# Patient Record
Sex: MALE | Race: WHITE | NOT HISPANIC OR LATINO | Employment: OTHER | URBAN - METROPOLITAN AREA
[De-identification: names, ages, dates, MRNs, and addresses within clinical notes are randomized per-mention and may not be internally consistent; named-entity substitution may affect disease eponyms.]

---

## 2023-03-23 LAB
AMPHETAMINE (PRESENCE) IN URINE BY SCREEN METHOD: NORMAL
BARBITURATES PRESENCE IN URINE BY SCREEN METHOD: NORMAL
BENZODIAZEPINE (PRESENCE) IN URINE BY SCREEN METHOD: NORMAL
CANNABINOIDS IN URINE BY SCREEN METHOD: NORMAL
COCAINE (PRESENCE) IN URINE BY SCREEN METHOD: NORMAL
DRUG SCREEN COMMENT URINE: NORMAL
FENTANYL URINE: NORMAL
METHADONE (PRESENCE) IN URINE BY SCREEN METHOD: NORMAL
OPIATES (PRESENCE) IN URINE BY SCREEN METHOD: NORMAL
OXYCODONE (PRESENCE) IN URINE BY SCREEN METHOD: NORMAL
PHENCYCLIDINE (PRESENCE) IN URINE BY SCREEN METHOD: NORMAL

## 2023-10-13 ENCOUNTER — TELEPHONE (OUTPATIENT)
Dept: NEUROLOGY | Facility: CLINIC | Age: 43
End: 2023-10-13
Payer: COMMERCIAL

## 2023-10-13 DIAGNOSIS — F90.2 ATTENTION DEFICIT HYPERACTIVITY DISORDER (ADHD), COMBINED TYPE: Primary | ICD-10-CM

## 2023-10-13 RX ORDER — DEXTROAMPHETAMINE SACCHARATE, AMPHETAMINE ASPARTATE, DEXTROAMPHETAMINE SULFATE AND AMPHETAMINE SULFATE 7.5; 7.5; 7.5; 7.5 MG/1; MG/1; MG/1; MG/1
1 TABLET ORAL 2 TIMES DAILY
COMMUNITY
End: 2023-10-13 | Stop reason: SDUPTHER

## 2023-10-13 RX ORDER — DEXTROAMPHETAMINE SACCHARATE, AMPHETAMINE ASPARTATE, DEXTROAMPHETAMINE SULFATE AND AMPHETAMINE SULFATE 7.5; 7.5; 7.5; 7.5 MG/1; MG/1; MG/1; MG/1
1 TABLET ORAL 2 TIMES DAILY
Qty: 60 TABLET | Refills: 0 | Status: SHIPPED | OUTPATIENT
Start: 2023-10-13 | End: 2023-11-10 | Stop reason: SDUPTHER

## 2023-10-25 PROBLEM — M54.16 LUMBAR RADICULOPATHY, CHRONIC: Status: ACTIVE | Noted: 2023-10-25

## 2023-10-25 PROBLEM — M25.551 RIGHT HIP PAIN: Status: ACTIVE | Noted: 2023-10-25

## 2023-10-25 PROBLEM — S73.192A ACETABULAR LABRUM TEAR, LEFT, INITIAL ENCOUNTER: Status: ACTIVE | Noted: 2023-10-25

## 2023-10-25 PROBLEM — R51.9 HEADACHE: Status: ACTIVE | Noted: 2023-10-25

## 2023-10-25 PROBLEM — K43.2 INCISIONAL HERNIA: Status: ACTIVE | Noted: 2023-10-25

## 2023-10-25 PROBLEM — R21 RASH: Status: ACTIVE | Noted: 2023-10-25

## 2023-10-25 PROBLEM — M25.851 FEMOROACETABULAR IMPINGEMENT OF RIGHT HIP: Status: ACTIVE | Noted: 2023-10-25

## 2023-10-25 PROBLEM — M16.12 OSTEOARTHRITIS OF LEFT HIP: Status: ACTIVE | Noted: 2023-10-25

## 2023-10-25 PROBLEM — B19.20 HEPATITIS C: Status: ACTIVE | Noted: 2023-10-25

## 2023-10-25 PROBLEM — G35 MULTIPLE SCLEROSIS (MULTI): Status: ACTIVE | Noted: 2023-10-25

## 2023-10-25 PROBLEM — F90.9 ADHD (ATTENTION DEFICIT HYPERACTIVITY DISORDER): Status: ACTIVE | Noted: 2023-10-25

## 2023-10-25 RX ORDER — NALOXONE HYDROCHLORIDE 4 MG/.1ML
4 SPRAY NASAL
COMMUNITY
Start: 2023-05-26 | End: 2023-10-26 | Stop reason: ALTCHOICE

## 2023-10-25 RX ORDER — CARVEDILOL 6.25 MG/1
6.25 TABLET ORAL 2 TIMES DAILY
COMMUNITY
Start: 2023-06-16 | End: 2023-10-26 | Stop reason: ALTCHOICE

## 2023-10-25 RX ORDER — QUETIAPINE FUMARATE 300 MG/1
300 TABLET, FILM COATED ORAL NIGHTLY
COMMUNITY
Start: 2023-08-29 | End: 2023-10-26 | Stop reason: ALTCHOICE

## 2023-10-25 RX ORDER — QUETIAPINE 400 MG/1
400 TABLET, FILM COATED, EXTENDED RELEASE ORAL NIGHTLY
COMMUNITY
Start: 2023-10-06

## 2023-10-25 RX ORDER — QUETIAPINE FUMARATE 100 MG/1
100 TABLET, FILM COATED ORAL NIGHTLY
COMMUNITY
Start: 2023-07-29 | End: 2023-10-26 | Stop reason: ALTCHOICE

## 2023-10-25 RX ORDER — BUPRENORPHINE HYDROCHLORIDE AND NALOXONE HYDROCHLORIDE DIHYDRATE 8; 2 MG/1; MG/1
1 TABLET SUBLINGUAL 2 TIMES DAILY
COMMUNITY
Start: 2023-10-07

## 2023-10-25 RX ORDER — NAPROXEN 500 MG/1
500 TABLET ORAL 2 TIMES DAILY PRN
COMMUNITY
Start: 2019-10-15 | End: 2023-10-26 | Stop reason: ALTCHOICE

## 2023-10-25 RX ORDER — GABAPENTIN 800 MG/1
800 TABLET ORAL 3 TIMES DAILY
COMMUNITY
End: 2024-03-11 | Stop reason: SDUPTHER

## 2023-10-25 RX ORDER — BUPRENORPHINE HYDROCHLORIDE AND NALOXONE HYDROCHLORIDE DIHYDRATE 2; .5 MG/1; MG/1
1 TABLET SUBLINGUAL 2 TIMES DAILY
COMMUNITY
Start: 2022-12-13 | End: 2023-10-26 | Stop reason: ALTCHOICE

## 2023-10-25 RX ORDER — NAPROXEN 250 MG/1
250 TABLET ORAL 2 TIMES DAILY PRN
COMMUNITY
End: 2023-10-26 | Stop reason: ALTCHOICE

## 2023-10-25 RX ORDER — DICLOFENAC SODIUM 10 MG/G
2 GEL TOPICAL 4 TIMES DAILY PRN
COMMUNITY
Start: 2023-06-15 | End: 2023-10-26 | Stop reason: ALTCHOICE

## 2023-10-25 RX ORDER — TAMSULOSIN HYDROCHLORIDE 0.4 MG/1
0.4 CAPSULE ORAL DAILY
COMMUNITY
Start: 2023-08-30

## 2023-10-25 RX ORDER — LURASIDONE HYDROCHLORIDE 40 MG/1
40 TABLET, FILM COATED ORAL
COMMUNITY
Start: 2023-10-06

## 2023-10-25 RX ORDER — HYDROXYZINE HYDROCHLORIDE 25 MG/1
TABLET, FILM COATED ORAL
COMMUNITY
Start: 2022-12-22

## 2023-10-25 RX ORDER — DIPHENHYDRAMINE HCL 25 MG
CAPSULE ORAL
COMMUNITY
Start: 2023-03-13

## 2023-10-25 RX ORDER — QUETIAPINE FUMARATE 200 MG/1
200 TABLET, FILM COATED ORAL NIGHTLY
COMMUNITY
Start: 2023-01-18 | End: 2023-10-26 | Stop reason: ALTCHOICE

## 2023-10-26 ENCOUNTER — APPOINTMENT (OUTPATIENT)
Dept: NEUROLOGY | Facility: CLINIC | Age: 43
End: 2023-10-26
Payer: COMMERCIAL

## 2023-10-26 ENCOUNTER — TELEMEDICINE (OUTPATIENT)
Dept: NEUROLOGY | Facility: CLINIC | Age: 43
End: 2023-10-26
Payer: COMMERCIAL

## 2023-10-26 DIAGNOSIS — F90.2 ATTENTION DEFICIT HYPERACTIVITY DISORDER (ADHD), COMBINED TYPE: ICD-10-CM

## 2023-10-26 DIAGNOSIS — G35 MULTIPLE SCLEROSIS (MULTI): Primary | ICD-10-CM

## 2023-10-26 PROCEDURE — 99213 OFFICE O/P EST LOW 20 MIN: CPT | Performed by: PSYCHIATRY & NEUROLOGY
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
## 2023-11-01 ENCOUNTER — PHARMACY VISIT (OUTPATIENT)
Dept: PHARMACY | Facility: CLINIC | Age: 43
End: 2023-11-01
Payer: COMMERCIAL

## 2023-11-01 ENCOUNTER — SPECIALTY PHARMACY (OUTPATIENT)
Dept: PHARMACY | Facility: CLINIC | Age: 43
End: 2023-11-01

## 2023-11-01 PROCEDURE — RXMED WILLOW AMBULATORY MEDICATION CHARGE

## 2023-11-10 DIAGNOSIS — F90.2 ATTENTION DEFICIT HYPERACTIVITY DISORDER (ADHD), COMBINED TYPE: ICD-10-CM

## 2023-11-13 RX ORDER — DEXTROAMPHETAMINE SACCHARATE, AMPHETAMINE ASPARTATE, DEXTROAMPHETAMINE SULFATE AND AMPHETAMINE SULFATE 7.5; 7.5; 7.5; 7.5 MG/1; MG/1; MG/1; MG/1
1 TABLET ORAL 2 TIMES DAILY
Qty: 60 TABLET | Refills: 0 | Status: SHIPPED | OUTPATIENT
Start: 2023-11-13 | End: 2023-12-11 | Stop reason: SDUPTHER

## 2023-11-30 ENCOUNTER — SPECIALTY PHARMACY (OUTPATIENT)
Dept: PHARMACY | Facility: CLINIC | Age: 43
End: 2023-11-30

## 2023-12-06 ENCOUNTER — SPECIALTY PHARMACY (OUTPATIENT)
Dept: PHARMACY | Facility: CLINIC | Age: 43
End: 2023-12-06

## 2023-12-06 PROCEDURE — RXMED WILLOW AMBULATORY MEDICATION CHARGE

## 2023-12-07 ENCOUNTER — PHARMACY VISIT (OUTPATIENT)
Dept: PHARMACY | Facility: CLINIC | Age: 43
End: 2023-12-07
Payer: COMMERCIAL

## 2023-12-11 DIAGNOSIS — F90.2 ATTENTION DEFICIT HYPERACTIVITY DISORDER (ADHD), COMBINED TYPE: ICD-10-CM

## 2023-12-11 RX ORDER — DEXTROAMPHETAMINE SACCHARATE, AMPHETAMINE ASPARTATE, DEXTROAMPHETAMINE SULFATE AND AMPHETAMINE SULFATE 7.5; 7.5; 7.5; 7.5 MG/1; MG/1; MG/1; MG/1
1 TABLET ORAL 2 TIMES DAILY
Qty: 60 TABLET | Refills: 0 | Status: SHIPPED | OUTPATIENT
Start: 2023-12-11 | End: 2023-12-13 | Stop reason: SDUPTHER

## 2023-12-13 DIAGNOSIS — F90.2 ATTENTION DEFICIT HYPERACTIVITY DISORDER (ADHD), COMBINED TYPE: ICD-10-CM

## 2023-12-13 RX ORDER — DEXTROAMPHETAMINE SACCHARATE, AMPHETAMINE ASPARTATE, DEXTROAMPHETAMINE SULFATE AND AMPHETAMINE SULFATE 7.5; 7.5; 7.5; 7.5 MG/1; MG/1; MG/1; MG/1
1 TABLET ORAL 2 TIMES DAILY
Qty: 60 TABLET | Refills: 0 | Status: SHIPPED
Start: 2023-12-13 | End: 2023-12-14 | Stop reason: WASHOUT

## 2023-12-14 DIAGNOSIS — F90.2 ATTENTION DEFICIT HYPERACTIVITY DISORDER (ADHD), COMBINED TYPE: Primary | ICD-10-CM

## 2023-12-14 RX ORDER — DEXTROAMPHETAMINE SACCHARATE, AMPHETAMINE ASPARTATE, DEXTROAMPHETAMINE SULFATE AND AMPHETAMINE SULFATE 5; 5; 5; 5 MG/1; MG/1; MG/1; MG/1
TABLET ORAL
Qty: 90 TABLET | Refills: 0 | Status: SHIPPED | OUTPATIENT
Start: 2023-12-14 | End: 2024-01-09 | Stop reason: SDUPTHER

## 2024-01-04 ENCOUNTER — PHARMACY VISIT (OUTPATIENT)
Dept: PHARMACY | Facility: CLINIC | Age: 44
End: 2024-01-04
Payer: COMMERCIAL

## 2024-01-04 ENCOUNTER — SPECIALTY PHARMACY (OUTPATIENT)
Dept: PHARMACY | Facility: CLINIC | Age: 44
End: 2024-01-04

## 2024-01-04 PROCEDURE — RXMED WILLOW AMBULATORY MEDICATION CHARGE

## 2024-01-09 DIAGNOSIS — F90.2 ATTENTION DEFICIT HYPERACTIVITY DISORDER (ADHD), COMBINED TYPE: ICD-10-CM

## 2024-01-10 RX ORDER — DEXTROAMPHETAMINE SACCHARATE, AMPHETAMINE ASPARTATE, DEXTROAMPHETAMINE SULFATE AND AMPHETAMINE SULFATE 5; 5; 5; 5 MG/1; MG/1; MG/1; MG/1
TABLET ORAL
Qty: 90 TABLET | Refills: 0 | Status: SHIPPED
Start: 2024-01-10 | End: 2024-02-05 | Stop reason: SDUPTHER

## 2024-02-01 ENCOUNTER — SPECIALTY PHARMACY (OUTPATIENT)
Dept: PHARMACY | Facility: CLINIC | Age: 44
End: 2024-02-01

## 2024-02-01 PROCEDURE — RXMED WILLOW AMBULATORY MEDICATION CHARGE

## 2024-02-02 ENCOUNTER — PHARMACY VISIT (OUTPATIENT)
Dept: PHARMACY | Facility: CLINIC | Age: 44
End: 2024-02-02
Payer: COMMERCIAL

## 2024-02-05 DIAGNOSIS — F90.2 ATTENTION DEFICIT HYPERACTIVITY DISORDER (ADHD), COMBINED TYPE: ICD-10-CM

## 2024-02-07 RX ORDER — DEXTROAMPHETAMINE SACCHARATE, AMPHETAMINE ASPARTATE, DEXTROAMPHETAMINE SULFATE AND AMPHETAMINE SULFATE 5; 5; 5; 5 MG/1; MG/1; MG/1; MG/1
TABLET ORAL
Qty: 90 TABLET | Refills: 0 | Status: SHIPPED | OUTPATIENT
Start: 2024-02-07 | End: 2024-03-07 | Stop reason: SDUPTHER

## 2024-02-29 ENCOUNTER — TELEMEDICINE (OUTPATIENT)
Dept: NEUROLOGY | Facility: CLINIC | Age: 44
End: 2024-02-29
Payer: COMMERCIAL

## 2024-02-29 DIAGNOSIS — G35 MULTIPLE SCLEROSIS (MULTI): Primary | ICD-10-CM

## 2024-02-29 DIAGNOSIS — F90.2 ATTENTION DEFICIT HYPERACTIVITY DISORDER (ADHD), COMBINED TYPE: ICD-10-CM

## 2024-02-29 PROCEDURE — 99213 OFFICE O/P EST LOW 20 MIN: CPT | Performed by: PSYCHIATRY & NEUROLOGY

## 2024-02-29 RX ORDER — MULTIVITAMIN
1 TABLET ORAL
COMMUNITY
Start: 2013-05-24

## 2024-02-29 RX ORDER — QUETIAPINE FUMARATE 200 MG/1
TABLET, FILM COATED ORAL
COMMUNITY
Start: 2024-02-16

## 2024-02-29 RX ORDER — PREDNISONE 10 MG/1
TABLET ORAL
Qty: 15 TABLET | Refills: 0 | Status: SHIPPED | OUTPATIENT
Start: 2024-02-29

## 2024-02-29 NOTE — PROGRESS NOTES
NEUROLOGY OUTPATIENT FOLLOW-UP NOTE    Assessment/Plan   Diagnoses and all orders for this visit:  Multiple sclerosis (CMS/HCC)  -     predniSONE (Deltasone) 10 mg tablet; 5 by mouth daily x 1 dy, 4 daily x 1 dy, 3 daily x 1 dy, 2 daily x 1 dy, 1 daily x 1 dy  Attention deficit hyperactivity disorder (ADHD), combined type      IMPRESSION:  MS.  New sensory symptoms could be MS or lumbar radiculopathy.  Stable ADHD    PLAN:  Prednisone taper over five days from 50 mg.  If not helpful, I will have to consider a three-day IV Solumedrol regimen.  Continue amphetamine-dextroamphetamine.  Follow-up 3-4 months, and he will do a physical visit at that time.      Alec Gunn Jr., M.D., [facility]   ----------    Virtual or Telephone Consent    A telephone visit (audio only) between the patient (at the originating site) and the provider (at the distant site) was utilized to provide this telehealth service.   Verbal consent was requested and obtained from Alec Conner on this date, 02/29/24 for a telehealth visit.   Multiple attempts at a video visit failed    I have personally reviewed the OARRS report for Alec Conner   I have considered the risks of abuse, dependence, addiction and diversion.   Last urine drug screening date ordered:  3/20/2023  Results of last screen:  Results as expected.  Controlled Substance Agreement:   I have printed this form and reviewed each line item with the patient and the patient has verbalized understanding.   Date of the last Controlled Substance Agreement:  3/20/2023     Medical marijuana registration number:  [id number], via [employer]; used for arthritis pain and cramping.    Subjective     Alec Conner is a 43 y.o. year old male here for follow-up.    Two weeks of tingling of the feet  Waking up with severe cramping in both legs.  Hands go numb as well    Adderall still helpful for ADHD        Past Medical History:   Diagnosis Date    Personal history of other  diseases of the musculoskeletal system and connective tissue     History of backache     Past Surgical History:   Procedure Laterality Date    CHOLECYSTECTOMY  02/26/2015    Cholecystectomy     Social History     Tobacco Use    Smoking status: Every Day     Types: Cigarettes    Smokeless tobacco: Never   Substance Use Topics    Alcohol use: Not Currently     Comment: Sober since 2/2023     family history includes Cancer in his father, mother, paternal grandfather, and paternal grandmother; Hypertension in his maternal grandfather, maternal grandmother, mother, paternal grandfather, and paternal grandmother; Stroke in his mother; cardiac disorder in his maternal grandfather, maternal grandmother, mother, paternal grandfather, and paternal grandmother.    Current Outpatient Medications:     multivitamin tablet, Take 1 tablet by mouth once daily., Disp: , Rfl:     QUEtiapine (SEROquel) 200 mg tablet, , Disp: , Rfl:     amphetamine-dextroamphetamine (Adderall) 20 mg tablet, 2 in morning, 1 at noon, Disp: 90 tablet, Rfl: 0    buprenorphine-naloxone (Suboxone) 8-2 mg SL tablet, Place 1 tablet under the tongue 2 times a day., Disp: , Rfl:     dimethyl fumarate (Tecfidera) 120 mg capsule,delayed release(DR/EC) capsule, TAKE ONE (1) CAPSULE BY MOUTH 2 TIMES DAILY., Disp: 180 capsule, Rfl: 3    gabapentin (Neurontin) 800 mg tablet, Take 1 tablet (800 mg) by mouth 3 times a day., Disp: , Rfl:     hydrOXYzine HCL (Atarax) 25 mg tablet, Take by mouth., Disp: , Rfl:     lurasidone (Latuda) 40 mg tablet, Take 1 tablet (40 mg) by mouth once daily in the evening. Take with meals., Disp: , Rfl:     nicotine polacrilex (Nicorette) 4 mg gum, Chew., Disp: , Rfl:     QUEtiapine XR (SEROquel XR) 400 mg 24 hr tablet, Take 1 tablet (400 mg) by mouth once daily at bedtime., Disp: , Rfl:     tamsulosin (Flomax) 0.4 mg 24 hr capsule, Take 1 capsule (0.4 mg) by mouth once daily., Disp: , Rfl:   Allergies   Allergen Reactions    Penicillins  Hives       Objective     There were no vitals taken for this visit.    CONSTITUTIONAL:  No acute distress    MENTAL STATUS:  Awake, alert, fully oriented to self, place, and time, with present short-term memory, good awareness of recent events, normal attention span, concentration, and fund of knowledge.    SPEECH AND LANGUAGE:  Can name and repeat, follows all commands, has no dysarthria    Remainder of exam deferred because of telephone visit    Alec Gunn Jr., M.D., FAAN

## 2024-03-01 ENCOUNTER — SPECIALTY PHARMACY (OUTPATIENT)
Dept: NEUROLOGY | Facility: HOSPITAL | Age: 44
End: 2024-03-01
Payer: COMMERCIAL

## 2024-03-06 ENCOUNTER — SPECIALTY PHARMACY (OUTPATIENT)
Dept: PHARMACY | Facility: CLINIC | Age: 44
End: 2024-03-06

## 2024-03-06 PROCEDURE — RXMED WILLOW AMBULATORY MEDICATION CHARGE

## 2024-03-07 ENCOUNTER — PHARMACY VISIT (OUTPATIENT)
Dept: PHARMACY | Facility: CLINIC | Age: 44
End: 2024-03-07
Payer: COMMERCIAL

## 2024-03-07 DIAGNOSIS — F90.2 ATTENTION DEFICIT HYPERACTIVITY DISORDER (ADHD), COMBINED TYPE: ICD-10-CM

## 2024-03-07 RX ORDER — DEXTROAMPHETAMINE SACCHARATE, AMPHETAMINE ASPARTATE, DEXTROAMPHETAMINE SULFATE AND AMPHETAMINE SULFATE 5; 5; 5; 5 MG/1; MG/1; MG/1; MG/1
TABLET ORAL
Qty: 90 TABLET | Refills: 0 | Status: SHIPPED | OUTPATIENT
Start: 2024-03-07 | End: 2024-04-09 | Stop reason: SDUPTHER

## 2024-03-11 DIAGNOSIS — G35 MULTIPLE SCLEROSIS (MULTI): Primary | ICD-10-CM

## 2024-03-13 RX ORDER — GABAPENTIN 800 MG/1
800 TABLET ORAL 3 TIMES DAILY
Qty: 270 TABLET | Refills: 3 | Status: SHIPPED | OUTPATIENT
Start: 2024-03-13 | End: 2025-03-13

## 2024-03-28 ENCOUNTER — SPECIALTY PHARMACY (OUTPATIENT)
Dept: PHARMACY | Facility: CLINIC | Age: 44
End: 2024-03-28

## 2024-03-28 RX ORDER — DIMETHYL FUMARATE 120 MG/1
CAPSULE ORAL 2 TIMES DAILY
Qty: 180 CAPSULE | Refills: 3 | Status: CANCELLED | OUTPATIENT
Start: 2024-03-28 | End: 2025-03-28

## 2024-03-29 DIAGNOSIS — G35 MULTIPLE SCLEROSIS (MULTI): Primary | ICD-10-CM

## 2024-04-01 PROCEDURE — RXMED WILLOW AMBULATORY MEDICATION CHARGE

## 2024-04-01 RX ORDER — DIMETHYL FUMARATE 120 MG/1
CAPSULE ORAL 2 TIMES DAILY
Qty: 180 CAPSULE | Refills: 3 | Status: SHIPPED | OUTPATIENT
Start: 2024-04-01 | End: 2025-04-01

## 2024-04-03 ENCOUNTER — SPECIALTY PHARMACY (OUTPATIENT)
Dept: PHARMACY | Facility: CLINIC | Age: 44
End: 2024-04-03

## 2024-04-04 ENCOUNTER — PHARMACY VISIT (OUTPATIENT)
Dept: PHARMACY | Facility: CLINIC | Age: 44
End: 2024-04-04
Payer: COMMERCIAL

## 2024-04-09 DIAGNOSIS — F90.2 ATTENTION DEFICIT HYPERACTIVITY DISORDER (ADHD), COMBINED TYPE: ICD-10-CM

## 2024-04-10 RX ORDER — DEXTROAMPHETAMINE SACCHARATE, AMPHETAMINE ASPARTATE, DEXTROAMPHETAMINE SULFATE AND AMPHETAMINE SULFATE 5; 5; 5; 5 MG/1; MG/1; MG/1; MG/1
TABLET ORAL
Qty: 90 TABLET | Refills: 0 | Status: SHIPPED | OUTPATIENT
Start: 2024-04-10 | End: 2024-05-08 | Stop reason: SDUPTHER

## 2024-04-30 ENCOUNTER — SPECIALTY PHARMACY (OUTPATIENT)
Dept: PHARMACY | Facility: CLINIC | Age: 44
End: 2024-04-30

## 2024-04-30 PROCEDURE — RXMED WILLOW AMBULATORY MEDICATION CHARGE

## 2024-05-02 ENCOUNTER — PHARMACY VISIT (OUTPATIENT)
Dept: PHARMACY | Facility: CLINIC | Age: 44
End: 2024-05-02
Payer: COMMERCIAL

## 2024-05-08 DIAGNOSIS — F90.2 ATTENTION DEFICIT HYPERACTIVITY DISORDER (ADHD), COMBINED TYPE: ICD-10-CM

## 2024-05-14 RX ORDER — DEXTROAMPHETAMINE SACCHARATE, AMPHETAMINE ASPARTATE, DEXTROAMPHETAMINE SULFATE AND AMPHETAMINE SULFATE 5; 5; 5; 5 MG/1; MG/1; MG/1; MG/1
TABLET ORAL
Qty: 90 TABLET | Refills: 0 | Status: SHIPPED | OUTPATIENT
Start: 2024-05-14 | End: 2024-06-05 | Stop reason: SDUPTHER

## 2024-05-23 ENCOUNTER — SPECIALTY PHARMACY (OUTPATIENT)
Dept: PHARMACY | Facility: CLINIC | Age: 44
End: 2024-05-23

## 2024-05-23 PROCEDURE — RXMED WILLOW AMBULATORY MEDICATION CHARGE

## 2024-05-24 ENCOUNTER — APPOINTMENT (OUTPATIENT)
Dept: NEUROLOGY | Facility: CLINIC | Age: 44
End: 2024-05-24
Payer: COMMERCIAL

## 2024-05-30 ENCOUNTER — SPECIALTY PHARMACY (OUTPATIENT)
Dept: PHARMACY | Facility: CLINIC | Age: 44
End: 2024-05-30

## 2024-06-04 ENCOUNTER — SPECIALTY PHARMACY (OUTPATIENT)
Dept: NEUROLOGY | Facility: HOSPITAL | Age: 44
End: 2024-06-04
Payer: COMMERCIAL

## 2024-06-04 NOTE — PROGRESS NOTES
[facility] Clinical Note    Alec Conner is a 44 y.o. male, who is on the specialty pharmacy service of: Multiple Sclerosis Core.  Alec Conner is taking: dimethyl fumarate 120 mg BID.     Alec was contacted on 6/4/2024.    Refer to the encounter summary report for documentation details about patient counseling and education.      Impression/Plan  Is patient high risk? (potential patients:  pregnancy, geriatric, pediatric)   no  Is laboratory follow-up needed? yes  Is a clinical intervention needed?  no  Next assessment date?  9/2/2024  Additional comments: n/a    Medication Adherence  The importance of adherence was discussed with the patient and they were advised to take the medication as prescribed by their provider. Alec was encouraged to call his physician's office if they have a question regarding a missed dose.     QOL/Patient Satisfaction  Rate your quality of life on scale of 1-10: 10 - Completely satisfied  Rate your satisfaction with  Specialty Pharmacy on scale of 1-10: 10 - Completely satisfied      Patient advised to contact the pharmacy if there are any changes to her medication list, including prescriptions, OTC medications, herbal products, or supplements. Patient was advised of [facility]’s dispensing process, refill timeline, contact information ([phone]), and patient management follow up. Patient confirmed understanding of education conducted during assessment. All patient questions and concerns were addressed to the best of my ability. Patient was encouraged to contact the specialty pharmacy with any questions or concerns.    Confirmed follow-up outreaches are properly scheduled. Reviewed goals of therapy in the program targets.    Elfego Conte, PharmD

## 2024-06-05 ENCOUNTER — SPECIALTY PHARMACY (OUTPATIENT)
Dept: PHARMACY | Facility: CLINIC | Age: 44
End: 2024-06-05

## 2024-06-05 DIAGNOSIS — F90.2 ATTENTION DEFICIT HYPERACTIVITY DISORDER (ADHD), COMBINED TYPE: ICD-10-CM

## 2024-06-06 RX ORDER — DEXTROAMPHETAMINE SACCHARATE, AMPHETAMINE ASPARTATE, DEXTROAMPHETAMINE SULFATE AND AMPHETAMINE SULFATE 5; 5; 5; 5 MG/1; MG/1; MG/1; MG/1
TABLET ORAL
Qty: 90 TABLET | Refills: 0 | Status: SHIPPED | OUTPATIENT
Start: 2024-06-06 | End: 2024-06-10 | Stop reason: SDUPTHER

## 2024-06-07 ENCOUNTER — TELEPHONE (OUTPATIENT)
Dept: NEUROLOGY | Facility: CLINIC | Age: 44
End: 2024-06-07
Payer: COMMERCIAL

## 2024-06-07 ENCOUNTER — PHARMACY VISIT (OUTPATIENT)
Dept: PHARMACY | Facility: CLINIC | Age: 44
End: 2024-06-07
Payer: COMMERCIAL

## 2024-06-07 DIAGNOSIS — F90.2 ATTENTION DEFICIT HYPERACTIVITY DISORDER (ADHD), COMBINED TYPE: ICD-10-CM

## 2024-06-10 RX ORDER — DEXTROAMPHETAMINE SACCHARATE, AMPHETAMINE ASPARTATE, DEXTROAMPHETAMINE SULFATE AND AMPHETAMINE SULFATE 5; 5; 5; 5 MG/1; MG/1; MG/1; MG/1
TABLET ORAL
Qty: 90 TABLET | Refills: 0 | Status: SHIPPED | OUTPATIENT
Start: 2024-06-10

## 2024-06-26 ENCOUNTER — APPOINTMENT (OUTPATIENT)
Dept: NEUROLOGY | Facility: CLINIC | Age: 44
End: 2024-06-26
Payer: COMMERCIAL

## 2024-06-26 DIAGNOSIS — G35 MULTIPLE SCLEROSIS (MULTI): Primary | ICD-10-CM

## 2024-06-26 DIAGNOSIS — F90.2 ATTENTION DEFICIT HYPERACTIVITY DISORDER (ADHD), COMBINED TYPE: ICD-10-CM

## 2024-06-26 PROCEDURE — 99213 OFFICE O/P EST LOW 20 MIN: CPT | Performed by: PSYCHIATRY & NEUROLOGY

## 2024-06-26 NOTE — PROGRESS NOTES
NEUROLOGY OUTPATIENT FOLLOW-UP NOTE    Assessment/Plan   Diagnoses and all orders for this visit:  Multiple sclerosis (Multi)  Attention deficit hyperactivity disorder (ADHD), combined type      IMPRESSION:  Stable MS.  Transient left weakness could be microexacerbation or pseudoexacerbation of MS in the context of use of marijuana.  Stable ADHD.    PLAN:  If the weakness returns and persists, I advised ED visit or to call me.  Continue dimethyl fumarate for MS prophylaxis.  Continue amphetamine-dextroamphetamine for ADHD.  I will see him in person in three months or prn.      Alec Gunn Jr., M.D., FAAN   ----------    Virtual or Telephone Consent    An interactive audio and video telecommunication system which permits real time communications between the patient (at the originating site) and provider (at the distant site) was utilized to provide this telehealth service.   Verbal consent was requested and obtained from Alec Conner on this date, 06/26/24 for a telehealth visit.     I have personally reviewed the OARRS report for Alec Conner   I have considered the risks of abuse, dependence, addiction and diversion.   Controlled Substance Agreement:   I have printed this form and reviewed each line item with the patient and the patient has verbalized understanding.   Date of the last Controlled Substance Agreement:  3/20/2023  Date of last urine tox screen:  2/16/2024 at [facility], positive for amphetamine, THC, and buprenorphine as expected for his known use of medical marijuana and opiate abuse treatment.      Subjective     Alec Conner is a 44 y.o. year old male here for follow-up.  History comes from the patient and from review of the available  and [facility] EMR.    He had transient weakness of the left side lasting a couple of days, about three weeks.  This was associated with marijuana use.  He presented to the [facility] ED, where basic labs reveals no acute pathology.  He says  he left the ED rather than remaining for further workup.  He currently has no focal neurological symptoms including dysarthria, dysphagia, diplopia, focal weakness, focal sensory change, ataxia, vertigo, or bowel/bladder incontinence, among others.      He continues to have relief of his ADHD symptoms with amphetamine-dextroamphetamine.    Past Medical History:   Diagnosis Date    Personal history of other diseases of the musculoskeletal system and connective tissue     History of backache     Past Surgical History:   Procedure Laterality Date    CHOLECYSTECTOMY  02/26/2015    Cholecystectomy     Social History     Tobacco Use    Smoking status: Every Day     Types: Cigarettes    Smokeless tobacco: Never   Substance Use Topics    Alcohol use: Not Currently     Comment: Sober since 2/2023     family history includes Cancer in his father, mother, paternal grandfather, and paternal grandmother; Hypertension in his maternal grandfather, maternal grandmother, mother, paternal grandfather, and paternal grandmother; Stroke in his mother; cardiac disorder in his maternal grandfather, maternal grandmother, mother, paternal grandfather, and paternal grandmother.    Current Outpatient Medications:     amphetamine-dextroamphetamine (Adderall) 20 mg tablet, 2 in morning, 1 at noon, Disp: 90 tablet, Rfl: 0    buprenorphine-naloxone (Suboxone) 8-2 mg SL tablet, Place 1 tablet under the tongue 2 times a day., Disp: , Rfl:     dimethyl fumarate (Tecfidera) 120 mg capsule,delayed release(DR/EC) capsule, TAKE ONE (1) CAPSULE BY MOUTH 2 TIMES DAILY., Disp: 180 capsule, Rfl: 3    gabapentin (Neurontin) 800 mg tablet, Take 1 tablet (800 mg) by mouth 3 times a day., Disp: 270 tablet, Rfl: 3    hydrOXYzine HCL (Atarax) 25 mg tablet, Take by mouth., Disp: , Rfl:     lurasidone (Latuda) 40 mg tablet, Take 1 tablet (40 mg) by mouth once daily in the evening. Take with meals., Disp: , Rfl:     multivitamin tablet, Take 1 tablet by mouth once  daily., Disp: , Rfl:     nicotine polacrilex (Nicorette) 4 mg gum, Chew., Disp: , Rfl:     predniSONE (Deltasone) 10 mg tablet, 5 by mouth daily x 1 dy, 4 daily x 1 dy, 3 daily x 1 dy, 2 daily x 1 dy, 1 daily x 1 dy, Disp: 15 tablet, Rfl: 0    QUEtiapine (SEROquel) 200 mg tablet, , Disp: , Rfl:     QUEtiapine XR (SEROquel XR) 400 mg 24 hr tablet, Take 1 tablet (400 mg) by mouth once daily at bedtime., Disp: , Rfl:     tamsulosin (Flomax) 0.4 mg 24 hr capsule, Take 1 capsule (0.4 mg) by mouth once daily., Disp: , Rfl:   Allergies   Allergen Reactions    Penicillins Hives       Objective     There were no vitals taken for this visit.    CONSTITUTIONAL:  No acute distress    MENTAL STATUS:  Awake, alert, fully oriented to self, place, and time, with present short-term memory, good awareness of recent events, normal attention span, concentration, and fund of knowledge.    SPEECH AND LANGUAGE:  Can name and repeat, follows all commands, has no dysarthria    FUNDOSCOPIC:  Deferred because of virtual visit    CRANIAL NERVES:  II-Vision grossly present, visual fields deferred because of virtual visit    III/IV/VI--EOMs are present in all directions.  No ptosis.  Pupillary response deferred because of virtual visit    V--Normal jaw movement.  Sensation deferred because of virtual visit    VII--No facial asymmetry.    VIII--Hearing grossly present given ability to hear me on the call.    IX/X--Symmetric soft palate rise.    XI--Antigravity, symmetrical trapezius power bilaterally.    XII--Tongue protrudes without deviation.    MOTOR:  Symmetric, antigravity power of both arms and both legs    SENSORY:  Deferred because of virtual visit    COORDINATION:  Normal finger-to-nose and heel-to-shin testing in both arms and both legs.    REFLEXES Deferred because of virtual visit    GAIT is normal, without spasticity, shuffling, ataxia, or steppage.      Alec Gunn Jr., M.D., FAAN

## 2024-07-05 ENCOUNTER — SPECIALTY PHARMACY (OUTPATIENT)
Dept: PHARMACY | Facility: CLINIC | Age: 44
End: 2024-07-05

## 2024-07-05 DIAGNOSIS — F90.2 ATTENTION DEFICIT HYPERACTIVITY DISORDER (ADHD), COMBINED TYPE: ICD-10-CM

## 2024-07-05 PROCEDURE — RXMED WILLOW AMBULATORY MEDICATION CHARGE

## 2024-07-05 RX ORDER — DEXTROAMPHETAMINE SACCHARATE, AMPHETAMINE ASPARTATE, DEXTROAMPHETAMINE SULFATE AND AMPHETAMINE SULFATE 5; 5; 5; 5 MG/1; MG/1; MG/1; MG/1
TABLET ORAL
Qty: 90 TABLET | Refills: 0 | Status: SHIPPED | OUTPATIENT
Start: 2024-07-05

## 2024-07-08 ENCOUNTER — PHARMACY VISIT (OUTPATIENT)
Dept: PHARMACY | Facility: CLINIC | Age: 44
End: 2024-07-08
Payer: COMMERCIAL

## 2024-07-30 ENCOUNTER — SPECIALTY PHARMACY (OUTPATIENT)
Dept: PHARMACY | Facility: CLINIC | Age: 44
End: 2024-07-30

## 2024-07-30 PROCEDURE — RXMED WILLOW AMBULATORY MEDICATION CHARGE

## 2024-08-01 DIAGNOSIS — F90.2 ATTENTION DEFICIT HYPERACTIVITY DISORDER (ADHD), COMBINED TYPE: ICD-10-CM

## 2024-08-02 RX ORDER — DEXTROAMPHETAMINE SACCHARATE, AMPHETAMINE ASPARTATE, DEXTROAMPHETAMINE SULFATE AND AMPHETAMINE SULFATE 5; 5; 5; 5 MG/1; MG/1; MG/1; MG/1
TABLET ORAL
Qty: 90 TABLET | Refills: 0 | Status: SHIPPED | OUTPATIENT
Start: 2024-08-02

## 2024-08-05 ENCOUNTER — PHARMACY VISIT (OUTPATIENT)
Dept: PHARMACY | Facility: CLINIC | Age: 44
End: 2024-08-05
Payer: COMMERCIAL

## 2024-08-19 DIAGNOSIS — F90.2 ATTENTION DEFICIT HYPERACTIVITY DISORDER (ADHD), COMBINED TYPE: ICD-10-CM

## 2024-08-19 RX ORDER — DEXTROAMPHETAMINE SACCHARATE, AMPHETAMINE ASPARTATE, DEXTROAMPHETAMINE SULFATE AND AMPHETAMINE SULFATE 5; 5; 5; 5 MG/1; MG/1; MG/1; MG/1
TABLET ORAL
Qty: 90 TABLET | Refills: 0 | OUTPATIENT
Start: 2024-08-19

## 2024-08-26 ENCOUNTER — SPECIALTY PHARMACY (OUTPATIENT)
Dept: PHARMACY | Facility: CLINIC | Age: 44
End: 2024-08-26

## 2024-08-26 PROCEDURE — RXMED WILLOW AMBULATORY MEDICATION CHARGE

## 2024-08-29 ENCOUNTER — SPECIALTY PHARMACY (OUTPATIENT)
Dept: PHARMACY | Facility: CLINIC | Age: 44
End: 2024-08-29

## 2024-08-30 ENCOUNTER — PHARMACY VISIT (OUTPATIENT)
Dept: PHARMACY | Facility: CLINIC | Age: 44
End: 2024-08-30
Payer: COMMERCIAL

## 2024-09-03 ENCOUNTER — TELEPHONE (OUTPATIENT)
Dept: NEUROLOGY | Facility: CLINIC | Age: 44
End: 2024-09-03
Payer: COMMERCIAL

## 2024-09-03 ENCOUNTER — SPECIALTY PHARMACY (OUTPATIENT)
Dept: NEUROLOGY | Facility: HOSPITAL | Age: 44
End: 2024-09-03
Payer: COMMERCIAL

## 2024-09-03 DIAGNOSIS — F90.2 ATTENTION DEFICIT HYPERACTIVITY DISORDER (ADHD), COMBINED TYPE: ICD-10-CM

## 2024-09-03 RX ORDER — DEXTROAMPHETAMINE SACCHARATE, AMPHETAMINE ASPARTATE, DEXTROAMPHETAMINE SULFATE AND AMPHETAMINE SULFATE 5; 5; 5; 5 MG/1; MG/1; MG/1; MG/1
TABLET ORAL
Qty: 90 TABLET | Refills: 0 | Status: SHIPPED | OUTPATIENT
Start: 2024-09-03

## 2024-09-03 NOTE — PROGRESS NOTES
[facility] Clinical Note    Alec Conner is a 44 y.o. male, who is on the specialty pharmacy service for management of:  Multiple Sclerosis Core.    Alec Conner is taking: dimethyl fumarate 120 mg capsule.    Alec was contacted on 9/3/2024 at 3:23 PM for a virtual pharmacy visit with encounter number [id number] from:   [place] [id number]  [facility] [address]TH FLOOR  [facility] [address]  Dept: [phone]  Dept Fax: [phone]  Loc: [phone]    Alec was offered a Telemedicine Video visit or Telephone visit.  Alec consented to a telephone visit, which was performed.    The most recent encounter visit with the referring prescriber Alec Gunn MD on 06/26/2024 was reviewed.  Pharmacy will continue to collaborate in the care of this patient with the referring prescriber Alec Gunn MD.    General Assessment      Impression/Plan  IMPRESSION/PLAN:  Is patient high risk (potential patients:  pregnancy, geriatric, pediatric)? No  Is laboratory follow-up needed? No  Is a clinical intervention needed? No  Next reassessment date? ~12/2/24  Additional comments: N/A    Refer to the encounter summary report for documentation details about patient counseling and education.      Medication Adherence    The importance of adherence was discussed with the patient and they were advised to take the medication as prescribed by their provider. Patient was encouraged to call their physician's office if they have a question regarding a missed dose.     QOL/Patient Satisfaction  Rate your quality of life on scale of 1-10: 10 - Completely satisfied  Rate your satisfaction with  Specialty Pharmacy on scale of 1-10: 10 - Completely satisfied      Patient was advised to contact the pharmacy if there are any changes to their medication list, including prescriptions, OTC medications, herbal products, or supplements. Patient was  advised of [facility]'s dispensing process, refill timeline, contact information ([phone]), and patient management follow up. Patient confirmed understanding of education conducted during assessment. All patient questions and concerns were addressed to the best of my ability. Patient was encouraged to contact the specialty pharmacy with any questions or concerns.    Confirmed follow-up outreaches are properly scheduled and reviewed goals of therapy with the patient.        ELISSA JEREZ, PharmD

## 2024-09-24 ENCOUNTER — SPECIALTY PHARMACY (OUTPATIENT)
Dept: PHARMACY | Facility: CLINIC | Age: 44
End: 2024-09-24

## 2024-09-24 PROCEDURE — RXMED WILLOW AMBULATORY MEDICATION CHARGE

## 2024-09-30 ENCOUNTER — APPOINTMENT (OUTPATIENT)
Dept: NEUROLOGY | Facility: CLINIC | Age: 44
End: 2024-09-30
Payer: COMMERCIAL

## 2024-10-04 ENCOUNTER — PHARMACY VISIT (OUTPATIENT)
Dept: PHARMACY | Facility: CLINIC | Age: 44
End: 2024-10-04
Payer: COMMERCIAL

## 2024-10-11 ENCOUNTER — TELEPHONE (OUTPATIENT)
Dept: NEUROLOGY | Facility: CLINIC | Age: 44
End: 2024-10-11

## 2024-10-11 ENCOUNTER — OFFICE VISIT (OUTPATIENT)
Dept: NEUROLOGY | Facility: CLINIC | Age: 44
End: 2024-10-11
Payer: COMMERCIAL

## 2024-10-11 VITALS
SYSTOLIC BLOOD PRESSURE: 124 MMHG | BODY MASS INDEX: 26.32 KG/M2 | HEART RATE: 94 BPM | HEIGHT: 71 IN | WEIGHT: 188 LBS | DIASTOLIC BLOOD PRESSURE: 73 MMHG | TEMPERATURE: 96.8 F | RESPIRATION RATE: 18 BRPM

## 2024-10-11 DIAGNOSIS — F90.2 ATTENTION DEFICIT HYPERACTIVITY DISORDER (ADHD), COMBINED TYPE: Primary | ICD-10-CM

## 2024-10-11 DIAGNOSIS — M54.16 LUMBAR RADICULOPATHY: ICD-10-CM

## 2024-10-11 DIAGNOSIS — G35 MULTIPLE SCLEROSIS (MULTI): ICD-10-CM

## 2024-10-11 DIAGNOSIS — N48.89 PENILE PAIN: ICD-10-CM

## 2024-10-11 DIAGNOSIS — G56.02 CARPAL TUNNEL SYNDROME, LEFT: ICD-10-CM

## 2024-10-11 PROCEDURE — 99214 OFFICE O/P EST MOD 30 MIN: CPT | Performed by: PSYCHIATRY & NEUROLOGY

## 2024-10-11 PROCEDURE — 3008F BODY MASS INDEX DOCD: CPT | Performed by: PSYCHIATRY & NEUROLOGY

## 2024-10-11 RX ORDER — PREDNISONE 10 MG/1
TABLET ORAL
Qty: 15 TABLET | Refills: 0 | Status: SHIPPED | OUTPATIENT
Start: 2024-10-11

## 2024-10-11 RX ORDER — DEXTROAMPHETAMINE SACCHARATE, AMPHETAMINE ASPARTATE, DEXTROAMPHETAMINE SULFATE AND AMPHETAMINE SULFATE 5; 5; 5; 5 MG/1; MG/1; MG/1; MG/1
TABLET ORAL
Qty: 90 TABLET | Refills: 0 | Status: SHIPPED | OUTPATIENT
Start: 2024-10-11

## 2024-10-11 RX ORDER — CLONIDINE HYDROCHLORIDE 0.1 MG/1
TABLET ORAL
COMMUNITY
Start: 2024-10-09

## 2024-10-11 ASSESSMENT — PATIENT HEALTH QUESTIONNAIRE - PHQ9
1. LITTLE INTEREST OR PLEASURE IN DOING THINGS: NOT AT ALL
SUM OF ALL RESPONSES TO PHQ9 QUESTIONS 1 AND 2: 0
2. FEELING DOWN, DEPRESSED OR HOPELESS: NOT AT ALL

## 2024-10-11 ASSESSMENT — ENCOUNTER SYMPTOMS
DEPRESSION: 0
LOSS OF SENSATION IN FEET: 0
OCCASIONAL FEELINGS OF UNSTEADINESS: 0

## 2024-10-11 ASSESSMENT — PAIN SCALES - GENERAL: PAINLEVEL: 2

## 2024-10-11 NOTE — PROGRESS NOTES
"NEUROLOGY OUTPATIENT FOLLOW-UP NOTE    Assessment/Plan   Diagnoses and all orders for this visit:  Attention deficit hyperactivity disorder (ADHD), combined type  -     amphetamine-dextroamphetamine (Adderall) 20 mg tablet; 2 in morning, 1 at noon  Multiple sclerosis (Multi)  Carpal tunnel syndrome, left  Lumbar radiculopathy  -     MR lumbar spine wo IV contrast; Future  Penile pain  -     MR lumbar spine wo IV contrast; Future  -     Referral to Urology; Future      IMPRESSION:  Stable multiple sclerosis on dimethyl fumarate.  Stable ADHD using amphetamine-dextroamphetamine 20 mg 2 in AM and 1 at noon.  New left carpal tunnel syndrome from compression.  Penile reduced sensation.  While he has exam evidence for lumbar radiculopathy, I found no other change in sensation around the groin or buttocks.  Will rule out significant enough lumbar pathology to cause this.    PLAN:  To continue dimethyl fumarate for MS prophylaxis.  To continue amphetamine-dextroamphetamine for ADHD management.  \"Cock-up\" wrist brace to wear at night on the left for carpal tunnel syndrome.  I also prescribed a prednisone taper to reduce the hand symptoms.  I ordered lumbar MRI to rule out severe enough lumbar disease to explain the penile tingling, and I also referred him to Urology for an opinion about his penile symptoms.  I will see him again in three months or prn.      Alec Gunn Jr., M.D., FAAN   ----------    I have personally reviewed the OARRS report for Alec Conner   I have considered the risks of abuse, dependence, addiction and diversion.   Controlled Substance Agreement:   I have printed this form and reviewed each line item with the patient and the patient has verbalized understanding.   Date of the last Controlled Substance Agreement:  10/11/2024 (Adderall)  Date of the last urine tox screen:  10/9/2024 at [facility]     Alec Conner is a 44 y.o. year old male here for follow-up.    Left hand " Discussed with pt to take Metformin BID and keep a BS log and bring to 8/21/17 appt numb for two weeks.  He noted this after he slept once for over 24 hours.  The 2nd-4th fingers are the most involved.  He denies associated weakness.    He continues to have relief of his ADHD symptoms with amphetamine-dextroamphetamine.     New penile numbness in the last couple of months, without associated leg weakness or neuropathic leg pain.    He denies other focal neurological symptoms including dysarthria, dysphagia, diplopia, focal weakness, other focal sensory change, ataxia, vertigo, or bowel/bladder incontinence, among others.      Past Medical History:   Diagnosis Date    Personal history of other diseases of the musculoskeletal system and connective tissue     History of backache     Past Surgical History:   Procedure Laterality Date    CHOLECYSTECTOMY  02/26/2015    Cholecystectomy     Social History     Tobacco Use    Smoking status: Every Day     Types: Cigarettes    Smokeless tobacco: Never   Substance Use Topics    Alcohol use: Not Currently     Comment: Sober since 2/2023     family history includes Cancer in his father, mother, paternal grandfather, and paternal grandmother; Hypertension in his maternal grandfather, maternal grandmother, mother, paternal grandfather, and paternal grandmother; Stroke in his mother; cardiac disorder in his maternal grandfather, maternal grandmother, mother, paternal grandfather, and paternal grandmother.    Current Outpatient Medications:     amphetamine-dextroamphetamine (Adderall) 20 mg tablet, 2 in morning, 1 at noon, Disp: 90 tablet, Rfl: 0    buprenorphine-naloxone (Suboxone) 8-2 mg SL tablet, Place 1 tablet under the tongue 2 times a day., Disp: , Rfl:     cloNIDine (Catapres) 0.1 mg tablet, , Disp: , Rfl:     dimethyl fumarate (Tecfidera) 120 mg capsule,delayed release(DR/EC) capsule, TAKE ONE (1) CAPSULE BY MOUTH 2 TIMES DAILY., Disp: 180 capsule, Rfl: 3    gabapentin (Neurontin) 800 mg tablet, Take 1 tablet (800 mg) by mouth 3 times a day., Disp: 270  "tablet, Rfl: 3    hydrOXYzine HCL (Atarax) 25 mg tablet, Take by mouth., Disp: , Rfl:     multivitamin tablet, Take 1 tablet by mouth once daily., Disp: , Rfl:     QUEtiapine (SEROquel) 200 mg tablet, , Disp: , Rfl:     QUEtiapine XR (SEROquel XR) 400 mg 24 hr tablet, Take 1 tablet (400 mg) by mouth once daily at bedtime., Disp: , Rfl:     lurasidone (Latuda) 40 mg tablet, Take 1 tablet (40 mg) by mouth once daily in the evening. Take with meals., Disp: , Rfl:     nicotine polacrilex (Nicorette) 4 mg gum, Chew., Disp: , Rfl:     predniSONE (Deltasone) 10 mg tablet, 5 by mouth daily x 1 dy, 4 daily x 1 dy, 3 daily x 1 dy, 2 daily x 1 dy, 1 daily x 1 dy (Patient not taking: Reported on 6/26/2024), Disp: 15 tablet, Rfl: 0    tamsulosin (Flomax) 0.4 mg 24 hr capsule, Take 1 capsule (0.4 mg) by mouth once daily., Disp: , Rfl:   Allergies   Allergen Reactions    Penicillins Hives       Objective     /73   Pulse 94   Temp 36 °C (96.8 °F) (Temporal)   Resp 18   Ht 1.803 m (5' 11\")   Wt 85.3 kg (188 lb)   BMI 26.22 kg/m²     CONSTITUTIONAL:  No acute distress    MENTAL STATUS:  Awake, alert, fully oriented to self, place, and time, with present short-term memory, good awareness of recent events, normal attention span, concentration, and fund of knowledge.    SPEECH AND LANGUAGE:  Can name and repeat, follows all commands, has no dysarthria    CRANIAL NERVES:  II-Vision present, visual fields full to confrontational testing    III/IV/VI--EOMs are present in all directions.  Pupils are symmetrically reactive in dim light.  No ptosis.    V--Normal facial sensation.    VII--No facial asymmetry.    VIII--Hearing present to voice bilaterally.    IX/X--Symmetric soft palate rise.    XI--Normal trapezius power bilaterally.    XII--Tongue protrudes without deviation.    MOTOR:  Normal power, tone, and bulk in both arms and both legs.    SENSORY:  Reduced pin sensation on the left lateral and medial shin, and dorsum of " foot, and in the left index finger compared to the pinky and thenar eminence, with present Tinel's at the left wrist.  Otherwise normal pin sensation in both arms and both legs without distal-proximal gradient, other asymmetry (including on the buttock and groin), or spinal sensory level.    COORDINATION:  Normal finger-to-nose and heel-to-shin testing in both arms and both legs.    REFLEXES are normal and symmetric at the biceps, triceps, brachioradialis, patella, and ankle.  The plantar responses are flexor.    GAIT is normal, without steppage, ataxia, shuffling, or spasticity.      Alec Gunn Jr., M.D., [facility]

## 2024-10-17 ENCOUNTER — APPOINTMENT (OUTPATIENT)
Dept: UROLOGY | Facility: CLINIC | Age: 44
End: 2024-10-17
Payer: COMMERCIAL

## 2024-10-31 ENCOUNTER — APPOINTMENT (OUTPATIENT)
Dept: RADIOLOGY | Facility: CLINIC | Age: 44
End: 2024-10-31
Payer: COMMERCIAL

## 2024-11-04 ENCOUNTER — SPECIALTY PHARMACY (OUTPATIENT)
Dept: PHARMACY | Facility: CLINIC | Age: 44
End: 2024-11-04

## 2024-11-04 DIAGNOSIS — F90.2 ATTENTION DEFICIT HYPERACTIVITY DISORDER (ADHD), COMBINED TYPE: ICD-10-CM

## 2024-11-04 PROCEDURE — RXMED WILLOW AMBULATORY MEDICATION CHARGE

## 2024-11-04 RX ORDER — DEXTROAMPHETAMINE SACCHARATE, AMPHETAMINE ASPARTATE, DEXTROAMPHETAMINE SULFATE AND AMPHETAMINE SULFATE 5; 5; 5; 5 MG/1; MG/1; MG/1; MG/1
TABLET ORAL
Qty: 90 TABLET | Refills: 0 | Status: SHIPPED | OUTPATIENT
Start: 2024-11-04

## 2024-11-05 ENCOUNTER — PHARMACY VISIT (OUTPATIENT)
Dept: PHARMACY | Facility: CLINIC | Age: 44
End: 2024-11-05
Payer: COMMERCIAL

## 2024-11-21 ENCOUNTER — APPOINTMENT (OUTPATIENT)
Dept: RADIOLOGY | Facility: CLINIC | Age: 44
End: 2024-11-21
Payer: COMMERCIAL

## 2024-12-02 DIAGNOSIS — F90.2 ATTENTION DEFICIT HYPERACTIVITY DISORDER (ADHD), COMBINED TYPE: ICD-10-CM

## 2024-12-03 RX ORDER — DEXTROAMPHETAMINE SACCHARATE, AMPHETAMINE ASPARTATE, DEXTROAMPHETAMINE SULFATE AND AMPHETAMINE SULFATE 5; 5; 5; 5 MG/1; MG/1; MG/1; MG/1
TABLET ORAL
Qty: 90 TABLET | Refills: 0 | Status: SHIPPED | OUTPATIENT
Start: 2024-12-03

## 2024-12-04 ENCOUNTER — SPECIALTY PHARMACY (OUTPATIENT)
Dept: NEUROLOGY | Facility: HOSPITAL | Age: 44
End: 2024-12-04
Payer: COMMERCIAL

## 2024-12-04 NOTE — PROGRESS NOTES
[facility] Clinical Note    Alec Conner is a 44 y.o. male, who is on the specialty pharmacy service for management of:  Multiple Sclerosis Core.    Alec Conner is taking: dimethyl fumarate 120 mg twice daily.    Alec was contacted on 12/4/2024 at 3:45 PM for a virtual pharmacy visit with encounter number [id number] from:   [place] [address]  [facility] 5TH FLOOR  [facility] [address]  Dept: [phone]  Dept Fax: [phone]  Loc: [phone]    Alec was offered a Telemedicine Video visit or Telephone visit.  Alec consented to a telephone visit, which was performed.    The most recent encounter visit with the referring prescriber Alec Gunn MD on 10/11/2024 was reviewed.  Pharmacy will continue to collaborate in the care of this patient with the referring prescriber Alec Gunn MD.    General Assessment      Impression/Plan  IMPRESSION/PLAN:  Is patient high risk (potential patients:  pregnancy, geriatric, pediatric)?  no  Is laboratory follow-up needed? no  Is a clinical intervention needed? no  Next reassessment date? 3/3/2025  Additional comments: n/a    Refer to the encounter summary report for documentation details about patient counseling and education.      Medication Adherence    The importance of adherence was discussed with the patient and they were advised to take the medication as prescribed by their provider. Patient was encouraged to call their physician's office if they have a question regarding a missed dose.     QOL/Patient Satisfaction  Rate your quality of life on scale of 1-10: 6  Rate your satisfaction with  Specialty Pharmacy on scale of 1-10: 10 - Completely satisfied      Patient was advised to contact the pharmacy if there are any changes to their medication list, including prescriptions, OTC medications, herbal products, or supplements. Patient was advised of [facility]'s dispensing process, refill timeline, contact information ([phone]), and patient management follow up. Patient confirmed understanding of education conducted during assessment. All patient questions and concerns were addressed to the best of my ability. Patient was encouraged to contact the specialty pharmacy with any questions or concerns.    Confirmed follow-up outreaches are properly scheduled and reviewed goals of therapy with the patient.        Elfego Conte, PharmD

## 2024-12-08 ENCOUNTER — SPECIALTY PHARMACY (OUTPATIENT)
Dept: PHARMACY | Facility: CLINIC | Age: 44
End: 2024-12-08

## 2025-01-02 DIAGNOSIS — F90.2 ATTENTION DEFICIT HYPERACTIVITY DISORDER (ADHD), COMBINED TYPE: ICD-10-CM

## 2025-01-02 DIAGNOSIS — G35 MULTIPLE SCLEROSIS (MULTI): ICD-10-CM

## 2025-01-02 RX ORDER — DEXTROAMPHETAMINE SACCHARATE, AMPHETAMINE ASPARTATE, DEXTROAMPHETAMINE SULFATE AND AMPHETAMINE SULFATE 5; 5; 5; 5 MG/1; MG/1; MG/1; MG/1
TABLET ORAL
Qty: 90 TABLET | Refills: 0 | Status: SHIPPED | OUTPATIENT
Start: 2025-01-02

## 2025-01-02 RX ORDER — GABAPENTIN 800 MG/1
800 TABLET ORAL 3 TIMES DAILY
Qty: 270 TABLET | Refills: 3 | Status: SHIPPED | OUTPATIENT
Start: 2025-01-02 | End: 2026-01-02

## 2025-01-30 ENCOUNTER — APPOINTMENT (OUTPATIENT)
Dept: NEUROLOGY | Facility: CLINIC | Age: 45
End: 2025-01-30
Payer: COMMERCIAL

## 2025-01-30 DIAGNOSIS — G35 MULTIPLE SCLEROSIS (MULTI): Primary | ICD-10-CM

## 2025-01-30 DIAGNOSIS — M54.16 LUMBAR RADICULOPATHY: ICD-10-CM

## 2025-01-30 DIAGNOSIS — G56.02 CARPAL TUNNEL SYNDROME, LEFT: ICD-10-CM

## 2025-01-30 DIAGNOSIS — N48.89 PENILE PAIN: ICD-10-CM

## 2025-01-30 DIAGNOSIS — F90.2 ATTENTION DEFICIT HYPERACTIVITY DISORDER (ADHD), COMBINED TYPE: ICD-10-CM

## 2025-01-30 PROCEDURE — 99213 OFFICE O/P EST LOW 20 MIN: CPT | Performed by: PSYCHIATRY & NEUROLOGY

## 2025-01-30 NOTE — PROGRESS NOTES
"NEUROLOGY OUTPATIENT FOLLOW-UP NOTE    Assessment/Plan   Diagnoses and all orders for this visit:  Multiple sclerosis (Multi)  Attention deficit hyperactivity disorder (ADHD), combined type  Carpal tunnel syndrome, left  Lumbar radiculopathy  Penile pain      Stable multiple sclerosis on dimethyl fumarate.  Stable ADHD using amphetamine-dextroamphetamine 20 mg 2 in AM and 1 at noon.  New left carpal tunnel syndrome from compression.  Penile reduced sensation.  While he has exam evidence for lumbar radiculopathy, I found no other change in sensation around the groin or buttocks.  Will rule out significant enough lumbar pathology to cause this.     PLAN:  To continue dimethyl fumarate for MS prophylaxis.  To continue amphetamine-dextroamphetamine for ADHD management.  \"Cock-up\" wrist brace to wear at night on the left for carpal tunnel syndrome.  I also prescribed a prednisone taper to reduce the hand symptoms.  He will reschedule MRI to rule out severe enough lumbar disease to explain the penile tingling, and he will also reschedule with Urology for an opinion about his penile symptoms.  I will see him again in three months or prn.      Alec Gunn Jr., M.D., FAAN   ----------    Virtual or Telephone Consent    An interactive audio and video telecommunication system which permits real time communications between the patient (at the originating site) and provider (at the distant site) was utilized to provide this telehealth service.   Verbal consent was requested and obtained from Alec Conner on this date, 01/30/25 for a telehealth visit.        I have personally reviewed the OARRS report for Alec Conner   I have considered the risks of abuse, dependence, addiction and diversion.   Controlled Substance Agreement:   I have printed this form and reviewed each line item with the patient and the patient has verbalized understanding.   Date of the last Controlled Substance Agreement:  10/11/2024 " (Adderall)  Date of the last urine tox screen:  10/9/2024 at [facility]     Alec Conner is a 44 y.o. year old male here for follow-up.    He continues to have relief of his ADHD symptoms with amphetamine-dextroamphetamine.      He denies other focal neurological symptoms including dysarthria, dysphagia, diplopia, focal weakness, other focal sensory change, ataxia, vertigo, or bowel/bladder incontinence, among others.      He has to reschedule MRI due to transport.    Past Medical History:   Diagnosis Date    Personal history of other diseases of the musculoskeletal system and connective tissue     History of backache     Past Surgical History:   Procedure Laterality Date    CHOLECYSTECTOMY  02/26/2015    Cholecystectomy     Social History     Tobacco Use    Smoking status: Every Day     Types: Cigarettes    Smokeless tobacco: Never   Substance Use Topics    Alcohol use: Not Currently     Comment: Sober since 2/2023     family history includes Cancer in his father, mother, paternal grandfather, and paternal grandmother; Hypertension in his maternal grandfather, maternal grandmother, mother, paternal grandfather, and paternal grandmother; Stroke in his mother; cardiac disorder in his maternal grandfather, maternal grandmother, mother, paternal grandfather, and paternal grandmother.    Current Outpatient Medications:     QUEtiapine XR (SEROquel XR) 400 mg 24 hr tablet, Take 1 tablet (400 mg) by mouth once daily at bedtime., Disp: , Rfl:     amphetamine-dextroamphetamine (Adderall) 20 mg tablet, 2 in morning, 1 at noon, Disp: 90 tablet, Rfl: 0    buprenorphine-naloxone (Suboxone) 8-2 mg SL tablet, Place 1 tablet under the tongue 2 times a day., Disp: , Rfl:     cloNIDine (Catapres) 0.1 mg tablet, , Disp: , Rfl:     dimethyl fumarate (Tecfidera) 120 mg capsule,delayed release(DR/EC) capsule, TAKE ONE (1) CAPSULE BY MOUTH 2 TIMES DAILY., Disp: 180 capsule, Rfl: 3    gabapentin (Neurontin) 800 mg tablet,  Take 1 tablet (800 mg) by mouth 3 times a day., Disp: 270 tablet, Rfl: 3    multivitamin tablet, Take 1 tablet by mouth once daily., Disp: , Rfl:     nicotine polacrilex (Nicorette) 4 mg gum, Chew., Disp: , Rfl:     predniSONE (Deltasone) 10 mg tablet, 5 by mouth daily x 1 dy, 4 daily x 1 dy, 3 daily x 1 dy, 2 daily x 1 dy, 1 daily x 1 dy, Disp: 15 tablet, Rfl: 0  Allergies   Allergen Reactions    Penicillins Hives       Objective     There were no vitals taken for this visit.    CONSTITUTIONAL:  No acute distress    MENTAL STATUS:  Awake, alert, fully oriented to self, place, and time, with present short-term memory, good awareness of recent events, normal attention span, concentration, and fund of knowledge.    SPEECH AND LANGUAGE:  Can name and repeat, follows all commands, has no dysarthria    FUNDOSCOPIC:  Deferred because of virtual visit    CRANIAL NERVES:  II-Vision grossly present, visual fields deferred because of virtual visit    III/IV/VI--EOMs are present in all directions.  No ptosis.  Pupillary response deferred because of virtual visit    V--Normal jaw movement.  Sensation deferred because of virtual visit    VII--No facial asymmetry.    VIII--Hearing grossly present given ability to hear me on the call.    IX/X--Symmetric soft palate rise.    XI--Antigravity, symmetrical trapezius power bilaterally.    XII--Tongue protrudes without deviation.    MOTOR:  Symmetric, antigravity power of both arms and both legs    SENSORY:  Deferred because of virtual visit    COORDINATION:  Normal finger-to-nose and heel-to-shin testing in both arms and both legs.    REFLEXES Deferred because of virtual visit    GAIT Normal      Alec Gunn Jr., M.D., FAAN

## 2025-02-05 DIAGNOSIS — F90.2 ATTENTION DEFICIT HYPERACTIVITY DISORDER (ADHD), COMBINED TYPE: ICD-10-CM

## 2025-02-06 RX ORDER — DEXTROAMPHETAMINE SACCHARATE, AMPHETAMINE ASPARTATE, DEXTROAMPHETAMINE SULFATE AND AMPHETAMINE SULFATE 5; 5; 5; 5 MG/1; MG/1; MG/1; MG/1
TABLET ORAL
Qty: 90 TABLET | Refills: 0 | Status: SHIPPED | OUTPATIENT
Start: 2025-02-06

## 2025-02-26 PROCEDURE — RXMED WILLOW AMBULATORY MEDICATION CHARGE

## 2025-02-27 ENCOUNTER — PHARMACY VISIT (OUTPATIENT)
Dept: PHARMACY | Facility: CLINIC | Age: 45
End: 2025-02-27
Payer: COMMERCIAL

## 2025-03-05 DIAGNOSIS — F90.2 ATTENTION DEFICIT HYPERACTIVITY DISORDER (ADHD), COMBINED TYPE: ICD-10-CM

## 2025-03-05 RX ORDER — DEXTROAMPHETAMINE SACCHARATE, AMPHETAMINE ASPARTATE, DEXTROAMPHETAMINE SULFATE AND AMPHETAMINE SULFATE 5; 5; 5; 5 MG/1; MG/1; MG/1; MG/1
TABLET ORAL
Qty: 90 TABLET | Refills: 0 | Status: SHIPPED | OUTPATIENT
Start: 2025-03-05

## 2025-03-13 ENCOUNTER — SPECIALTY PHARMACY (OUTPATIENT)
Dept: NEUROLOGY | Facility: HOSPITAL | Age: 45
End: 2025-03-13
Payer: COMMERCIAL

## 2025-03-13 NOTE — PROGRESS NOTES
[facility] Clinical Note  Patient Reassessment     Introduction  Alec Conner is a 44 y.o. male who is on the specialty pharmacy service for management of: Multiple Sclerosis Core.      [facility] supplied medication: Dimethyl fumarate 120 mg capsules    Duration of therapy: Maintenance    The most recent encounter visit with the referring prescriber Alec Gunn MD on 1/30/2025 was reviewed.  Pharmacy will continue to collaborate in the care of this patient with the referring prescriber.    Discussion  Alec was contacted on 3/13/2025 at 1:45 PM for a pharmacy visit with encounter number [id number] from:   [facility]  [address] [facility] [address] FLO[address]  [facility] [address]  Dept: [phone]  Dept Fax: [phone]  Loc: [phone]  Alec consented to a/an Telephone visit, which was performed.    Efficacy  Patient has developed new symptoms of condition: Yes - patient has had new numbness in his arms and legs. Worsening MS hug symptoms.  Patient/caregiver feels medication is affecting the disease state: MS is progressing.    Goals  Provided education on goals and possible outcomes of therapy:  Adherence with therapy  Timely completion of appropriate labs  Timely and appropriate follow up with provider  Identify and address medication interactions with presciption medications, OTC medications and supplements  Optimize or maintain quality of life  Neurology- MS/NMOSD: No new or active MRI lesions  No clinical relapses  Absence of confirmed disability progression   Patient has documented target(s) for goals of therapy: No    Tolerance  Patient has experienced side effects from this medication: No  Changes to current therapy regimen: No    The follow-up timeline was discussed. Every person responds to and reacts to therapy differently. Patient should be assessed for efficacy and tolerability in approximately:  annually    Adherence  Patient Information  Informant: Self (Patient)  Demonstrates Understanding of Importance of Adherence: Yes  Does the patient have any barriers to self-administration (including physical and mental?): No  Adherence Tools Used: Medication list  Medication Information  Medication: dimethyl fumarate (Tecfidera)  Patient Reported Missed Doses in the Last 4 Weeks: More than 5  Estimated Medication Adherence Level: 51-75%  Adherence Estimation Source: Claims history  Barriers to Adherence: No Problems identified   The importance of adherence was discussed and patient/caregiver was advised to take the medication as prescribed by their provider. Encouraged patient/caregiver to call physician's office or specialty pharmacy if they have a question regarding a missed dose.    General Assessment  Changes to home medications, OTCs or supplements: No  Current Outpatient Medications   Medication Sig Dispense Refill    amphetamine-dextroamphetamine (Adderall) 20 mg tablet 2 in morning, 1 at noon 90 tablet 0    buprenorphine-naloxone (Suboxone) 8-2 mg SL tablet Place 1 tablet under the tongue 2 times a day.      cloNIDine (Catapres) 0.1 mg tablet       dimethyl fumarate (Tecfidera) 120 mg capsule,delayed release(DR/EC) capsule TAKE ONE (1) CAPSULE BY MOUTH 2 TIMES DAILY. 180 capsule 3    gabapentin (Neurontin) 800 mg tablet Take 1 tablet (800 mg) by mouth 3 times a day. 270 tablet 3    multivitamin tablet Take 1 tablet by mouth once daily.      nicotine polacrilex (Nicorette) 4 mg gum Chew.      predniSONE (Deltasone) 10 mg tablet 5 by mouth daily x 1 dy, 4 daily x 1 dy, 3 daily x 1 dy, 2 daily x 1 dy, 1 daily x 1 dy 15 tablet 0    QUEtiapine XR (SEROquel XR) 400 mg 24 hr tablet Take 1 tablet (400 mg) by mouth once daily at bedtime.       No current facility-administered medications for this visit.     Reported new allergies: No  Reported new medical conditions: No  Additional monitoring reviewed: Neurology -  "MS/NMOSD - CBC-diff:   Lab Results   Component Value Date    WBC 8.3 09/01/2021    RBC 5.32 09/01/2021    HGB 16.3 09/01/2021    HCT 47.8 09/01/2021    MCV 90 09/01/2021    MCHC 34.1 09/01/2021     09/01/2021    RDW 13.1 09/01/2021    and LFTs and bilirubin: No results found for: \"ALT\", \"AST\", \"GGT\", \"ALKPHOS\", \"BILITOT\"  Is laboratory follow up needed? No    Advised to contact the pharmacy if there are any changes to the patient's medication list, including prescriptions, OTC medications, herbal products, or supplements.    Impression/Plan  This patient has not been identified as high risk due to Lack of high risk qualifiers.  The following action was taken: N/A.    QOL/Patient Satisfaction  Rate your quality of life on scale of 1-10:  (Patient declined)  Rate your satisfaction with  Specialty Pharmacy on scale of 1-10:  (Patient declined)    Provided contact information ([phone]) for [facility] and reviewed dispensing process, refill timeline and patient management follow up. Confirmed understanding of education conducted during assessment. All questions and concerns were addressed and patient/caregiver was encouraged to reach out for additional questions or concerns.    Based on the patient's diagnosis, medication list, progress towards goals, adherence, tolerance, and medication list, medication remains appropriate: Therapy remains appropriate (I attest)    Elfego Conte, PharmD    "

## 2025-03-31 ENCOUNTER — SPECIALTY PHARMACY (OUTPATIENT)
Dept: PHARMACY | Facility: CLINIC | Age: 45
End: 2025-03-31

## 2025-04-03 DIAGNOSIS — F90.2 ATTENTION DEFICIT HYPERACTIVITY DISORDER (ADHD), COMBINED TYPE: ICD-10-CM

## 2025-04-04 RX ORDER — DEXTROAMPHETAMINE SACCHARATE, AMPHETAMINE ASPARTATE, DEXTROAMPHETAMINE SULFATE AND AMPHETAMINE SULFATE 5; 5; 5; 5 MG/1; MG/1; MG/1; MG/1
TABLET ORAL
Qty: 90 TABLET | Refills: 0 | Status: SHIPPED | OUTPATIENT
Start: 2025-04-04

## 2025-04-09 DIAGNOSIS — G35 MULTIPLE SCLEROSIS (MULTI): ICD-10-CM

## 2025-04-10 PROCEDURE — RXMED WILLOW AMBULATORY MEDICATION CHARGE

## 2025-04-10 RX ORDER — DIMETHYL FUMARATE 120 MG/1
CAPSULE ORAL 2 TIMES DAILY
Qty: 180 CAPSULE | Refills: 3 | Status: SHIPPED | OUTPATIENT
Start: 2025-04-10 | End: 2026-04-10

## 2025-04-14 ENCOUNTER — PHARMACY VISIT (OUTPATIENT)
Dept: PHARMACY | Facility: CLINIC | Age: 45
End: 2025-04-14
Payer: COMMERCIAL

## 2025-05-07 ENCOUNTER — APPOINTMENT (OUTPATIENT)
Dept: NEUROLOGY | Facility: CLINIC | Age: 45
End: 2025-05-07
Payer: COMMERCIAL

## 2025-05-09 PROCEDURE — RXMED WILLOW AMBULATORY MEDICATION CHARGE

## 2025-05-21 ENCOUNTER — SPECIALTY PHARMACY (OUTPATIENT)
Dept: PHARMACY | Facility: CLINIC | Age: 45
End: 2025-05-21

## 2025-05-22 ENCOUNTER — PHARMACY VISIT (OUTPATIENT)
Dept: PHARMACY | Facility: CLINIC | Age: 45
End: 2025-05-22
Payer: COMMERCIAL

## 2025-05-28 ENCOUNTER — OFFICE VISIT (OUTPATIENT)
Dept: NEUROLOGY | Facility: CLINIC | Age: 45
End: 2025-05-28
Payer: COMMERCIAL

## 2025-05-28 VITALS
HEIGHT: 71 IN | HEART RATE: 72 BPM | DIASTOLIC BLOOD PRESSURE: 79 MMHG | WEIGHT: 191 LBS | SYSTOLIC BLOOD PRESSURE: 125 MMHG | TEMPERATURE: 97.8 F | BODY MASS INDEX: 26.74 KG/M2

## 2025-05-28 DIAGNOSIS — G35 MULTIPLE SCLEROSIS (MULTI): Primary | ICD-10-CM

## 2025-05-28 DIAGNOSIS — M54.16 LUMBAR RADICULOPATHY: ICD-10-CM

## 2025-05-28 DIAGNOSIS — F90.2 ATTENTION DEFICIT HYPERACTIVITY DISORDER (ADHD), COMBINED TYPE: ICD-10-CM

## 2025-05-28 PROCEDURE — 99213 OFFICE O/P EST LOW 20 MIN: CPT | Performed by: PSYCHIATRY & NEUROLOGY

## 2025-05-28 PROCEDURE — 3008F BODY MASS INDEX DOCD: CPT | Performed by: PSYCHIATRY & NEUROLOGY

## 2025-05-28 RX ORDER — DEXTROAMPHETAMINE SACCHARATE, AMPHETAMINE ASPARTATE, DEXTROAMPHETAMINE SULFATE AND AMPHETAMINE SULFATE 5; 5; 5; 5 MG/1; MG/1; MG/1; MG/1
TABLET ORAL
Qty: 90 TABLET | Refills: 0 | Status: SHIPPED | OUTPATIENT
Start: 2025-05-28

## 2025-05-28 NOTE — PROGRESS NOTES
NEUROLOGY OUTPATIENT FOLLOW-UP NOTE    Assessment/Plan   Diagnoses and all orders for this visit:  Multiple sclerosis (Multi)  Attention deficit hyperactivity disorder (ADHD), combined type  -     amphetamine-dextroamphetamine (Adderall) 20 mg tablet; 2 in morning, 1 at noon  Lumbar radiculopathy      IMPRESSION:  Stable multiple sclerosis on dimethyl fumarate.  Stable ADHD using amphetamine-dextroamphetamine 20 mg 2 in AM and 1 at noon.    PLAN:  To continue dimethyl fumarate for MS prophylaxis.  To continue amphetamine-dextroamphetamine for ADHD management.  I will see him in three months or prn.      Aelc Gunn Jr., M.D., FAAN   ----------    I have personally reviewed the [facility] report for Alec Conner   I have considered the risks of abuse, dependence, addiction and diversion.   Controlled Substance Agreement:   I have printed this form and reviewed each line item with the patient and the patient has verbalized understanding.   Date of the last Controlled Substance Agreement:  10/21/2024  Date of the last urine tox screen:  5/22/2025 (MJ and bup positive, as expected for this patient)    Subjective     Alec Conner is a 45 y.o. year old male here for follow-up.    He continues to have relief of his ADHD symptoms with amphetamine-dextroamphetamine.       He denies new focal neurological symptoms including dysarthria, dysphagia, diplopia, focal weakness, other focal sensory change, ataxia, vertigo, or bowel/bladder incontinence, among others.  Still has non-neurological right hip paiun.    Medical History[1]  Surgical History[2]  Social History     Tobacco Use    Smoking status: Every Day     Types: Cigarettes    Smokeless tobacco: Never   Substance Use Topics    Alcohol use: Not Currently     Comment: Sober since 2/2023     family history includes Cancer in his father, mother, paternal grandfather, and paternal grandmother; Hypertension in his maternal grandfather, maternal grandmother,  "mother, paternal grandfather, and paternal grandmother; Stroke in his mother; cardiac disorder in his maternal grandfather, maternal grandmother, mother, paternal grandfather, and paternal grandmother.  Current Medications[3]  Allergies[4]    Objective     /79   Pulse 72   Temp 36.6 °C (97.8 °F)   Ht 1.803 m (5' 11\")   Wt 86.6 kg (191 lb)   BMI 26.64 kg/m²     CONSTITUTIONAL:  No acute distress    MENTAL STATUS:  Awake, alert, fully oriented to self, place, and time, with present short-term memory, good awareness of recent events, normal attention span, concentration, and fund of knowledge.    SPEECH AND LANGUAGE:  Can name and repeat, follows all commands, has no dysarthria    CRANIAL NERVES:  II-Vision present, visual fields full to confrontational testing    III/IV/VI--EOMs are present in all directions.  Pupils are symmetrically reactive in dim light.  No ptosis.    V--Normal facial sensation.    VII--No facial asymmetry.    VIII--Hearing present to voice bilaterally.    IX/X--Symmetric soft palate rise.    XI--Normal trapezius power bilaterally.    XII--Tongue protrudes without deviation.    MOTOR:  Normal power, tone, and bulk in both arms and both legs.    SENSORY:  Reduced pin sensation on the left lateral and medial shin, and dorsum of foot, and in the left index finger compared to the pinky and thenar eminence, with present Tinel's at the left wrist.  Otherwise normal pin sensation in both arms and both legs without distal-proximal gradient, other asymmetry (including on the buttock and groin), or spinal sensory level.     COORDINATION:  Normal finger-to-nose and heel-to-shin testing in both arms and both legs.    REFLEXES are normal and symmetric at the biceps, triceps, brachioradialis, patella, and ankle.  The plantar responses are flexor.    GAIT is normal, without steppage, ataxia, shuffling, or spasticity.      Alec Gunn Jr., M.D., FAAN         [1]   Past Medical " History:  Diagnosis Date    Personal history of other diseases of the musculoskeletal system and connective tissue     History of backache   [2]   Past Surgical History:  Procedure Laterality Date    CHOLECYSTECTOMY  02/26/2015    Cholecystectomy   [3]   Current Outpatient Medications:     amphetamine-dextroamphetamine (Adderall) 20 mg tablet, 2 in morning, 1 at noon, Disp: 90 tablet, Rfl: 0    buprenorphine-naloxone (Suboxone) 8-2 mg SL tablet, Place 1 tablet under the tongue 2 times a day., Disp: , Rfl:     cloNIDine (Catapres) 0.1 mg tablet, , Disp: , Rfl:     dimethyl fumarate (Tecfidera) 120 mg capsule,delayed release(DR/EC) capsule, TAKE ONE (1) CAPSULE BY MOUTH 2 TIMES DAILY., Disp: 180 capsule, Rfl: 3    gabapentin (Neurontin) 800 mg tablet, Take 1 tablet (800 mg) by mouth 3 times a day., Disp: 270 tablet, Rfl: 3    multivitamin tablet, Take 1 tablet by mouth once daily., Disp: , Rfl:     nicotine polacrilex (Nicorette) 4 mg gum, Chew., Disp: , Rfl:     predniSONE (Deltasone) 10 mg tablet, 5 by mouth daily x 1 dy, 4 daily x 1 dy, 3 daily x 1 dy, 2 daily x 1 dy, 1 daily x 1 dy, Disp: 15 tablet, Rfl: 0    QUEtiapine XR (SEROquel XR) 400 mg 24 hr tablet, Take 1 tablet (400 mg) by mouth once daily at bedtime., Disp: , Rfl:   [4]   Allergies  Allergen Reactions    Penicillins Hives

## 2025-06-04 ENCOUNTER — SPECIALTY PHARMACY (OUTPATIENT)
Dept: PHARMACY | Facility: CLINIC | Age: 45
End: 2025-06-04

## 2025-06-04 PROCEDURE — RXMED WILLOW AMBULATORY MEDICATION CHARGE

## 2025-06-06 ENCOUNTER — PHARMACY VISIT (OUTPATIENT)
Dept: PHARMACY | Facility: CLINIC | Age: 45
End: 2025-06-06
Payer: COMMERCIAL

## 2025-06-13 ENCOUNTER — SPECIALTY PHARMACY (OUTPATIENT)
Dept: NEUROLOGY | Facility: HOSPITAL | Age: 45
End: 2025-06-13
Payer: COMMERCIAL

## 2025-06-13 NOTE — PROGRESS NOTES
[facility] Clinical Note  Patient Reassessment     Introduction  Alec Conner is a 45 y.o. male who is on the specialty pharmacy service for management of: Multiple Sclerosis Core.      [facility] supplied medication: Dimethyl fumarate 120mg capsules. Take 1 capsule by mouth twice daily.        Duration of therapy: Maintenance    The most recent encounter visit with the referring prescriber Alec Gunn MD on 5/28/2025 was reviewed.  Pharmacy will continue to collaborate in the care of this patient with the referring prescriber.    Discussion  Alec was contacted on 6/13/2025 at 1:57 PM for a pharmacy visit with encounter number [id number] from:   [facility]  [address]  [facility] [address]  Dept: [phone]  Dept Fax: [phone]  Loc: [phone]  Alec consented to a/an Telephone visit, which was performed.    Efficacy  Patient has developed new symptoms of condition: No  Patient/caregiver feels medication is affecting the disease state: MS is stable    Goals  Provided education on goals and possible outcomes of therapy:  Adherence with therapy  Timely completion of appropriate labs  Timely and appropriate follow up with provider  Identify and address medication interactions with presciption medications, OTC medications and supplements  Optimize or maintain quality of life  Neurology- MS/NMOSD: No new or active MRI lesions  No clinical relapses  Absence of confirmed disability progression  Patient has documented target(s) for goals of therapy: No        Tolerance  Patient has experienced side effects from this medication: No, but has previously struggled with flushing, which is why he is on a reduced dose.  Changes to current therapy regimen: No    The follow-up timeline was discussed. Every person responds to and reacts to therapy differently. Patient should be assessed for efficacy and  "tolerability in approximately: annually            Adherence  Patient Information  Informant: Self (Patient)  Demonstrates Understanding of Importance of Adherence: Yes  Does the patient have any barriers to self-administration (including physical and mental?): No  Adherence Tools Used: Medication list  Medication Information  Medication: dimethyl fumarate (Tecfidera)  Patient Reported Missed Doses in the Last 4 Weeks: More than 5  Estimated Medication Adherence Level: 51-75%  Adherence Estimation Source: Claims history  Barriers to Adherence: Patient forgets, Adverse Effects  Action Taken to Address Barriers to Adherence: Encourged use of adherence aids such as phone alarms   The importance of adherence was discussed and patient/caregiver was advised to take the medication as prescribed by their provider. Encouraged patient/caregiver to call physician's office or specialty pharmacy if they have a question regarding a missed dose.    General Assessment  Changes to home medications, OTCs or supplements: Yes - he is on a short course of clindamycin for cellulitis.  Current Medications[1]  Reported new allergies: No  Reported new medical conditions: No  Additional monitoring reviewed: Neurology - MS/NMOSD - CBC-diff:   Lab Results   Component Value Date    WBC 8.3 09/01/2021    RBC 5.32 09/01/2021    HGB 16.3 09/01/2021    HCT 47.8 09/01/2021    MCV 90 09/01/2021    MCHC 34.1 09/01/2021     09/01/2021    RDW 13.1 09/01/2021    and LFTs and bilirubin: No results found for: \"ALT\", \"AST\", \"GGT\", \"ALKPHOS\", \"BILITOT\"  Is laboratory follow up needed? No    Advised to contact the pharmacy if there are any changes to the patient's medication list, including prescriptions, OTC medications, herbal products, or supplements.    Impression/Plan  This patient has not been identified as high risk due to Lack of high risk qualifiers.  The following action was taken:N/A          QOL/Patient Satisfaction  Rate your quality of " life on scale of 1-10: 8  Rate your satisfaction with  Specialty Pharmacy on scale of 1-10: 10 - Completely satisfied    Provided contact information ([phone]) for [facility] [place] [facility] and reviewed dispensing process, refill timeline and patient management follow up. Confirmed understanding of education conducted during assessment. All questions and concerns were addressed and patient/caregiver was encouraged to reach out for additional questions or concerns.    Based on the patient's diagnosis, medication list, progress towards goals, adherence, tolerance, and medication list, medication remains appropriate: Therapy remains appropriate (I attest)    Elfego Conte, PharmD       [1]   Current Outpatient Medications   Medication Sig Dispense Refill    amphetamine-dextroamphetamine (Adderall) 20 mg tablet 2 in morning, 1 at noon 90 tablet 0    buprenorphine-naloxone (Suboxone) 8-2 mg SL tablet Place 1 tablet under the tongue 2 times a day.      cloNIDine (Catapres) 0.1 mg tablet       dimethyl fumarate (Tecfidera) 120 mg capsule,delayed release(DR/EC) capsule TAKE ONE (1) CAPSULE BY MOUTH 2 TIMES DAILY. 180 capsule 3    gabapentin (Neurontin) 800 mg tablet Take 1 tablet (800 mg) by mouth 3 times a day. 270 tablet 3    multivitamin tablet Take 1 tablet by mouth once daily.      nicotine polacrilex (Nicorette) 4 mg gum Chew.      predniSONE (Deltasone) 10 mg tablet 5 by mouth daily x 1 dy, 4 daily x 1 dy, 3 daily x 1 dy, 2 daily x 1 dy, 1 daily x 1 dy 15 tablet 0    QUEtiapine XR (SEROquel XR) 400 mg 24 hr tablet Take 1 tablet (400 mg) by mouth once daily at bedtime.       No current facility-administered medications for this visit.

## 2025-06-23 DIAGNOSIS — F90.2 ATTENTION DEFICIT HYPERACTIVITY DISORDER (ADHD), COMBINED TYPE: ICD-10-CM

## 2025-06-23 RX ORDER — DEXTROAMPHETAMINE SACCHARATE, AMPHETAMINE ASPARTATE, DEXTROAMPHETAMINE SULFATE AND AMPHETAMINE SULFATE 5; 5; 5; 5 MG/1; MG/1; MG/1; MG/1
TABLET ORAL
Qty: 90 TABLET | Refills: 0 | Status: SHIPPED | OUTPATIENT
Start: 2025-06-23

## 2025-06-26 ENCOUNTER — TELEPHONE (OUTPATIENT)
Dept: NEUROLOGY | Facility: CLINIC | Age: 45
End: 2025-06-26
Payer: COMMERCIAL

## 2025-06-26 DIAGNOSIS — F90.2 ATTENTION DEFICIT HYPERACTIVITY DISORDER (ADHD), COMBINED TYPE: ICD-10-CM

## 2025-06-30 RX ORDER — DEXTROAMPHETAMINE SACCHARATE, AMPHETAMINE ASPARTATE, DEXTROAMPHETAMINE SULFATE AND AMPHETAMINE SULFATE 5; 5; 5; 5 MG/1; MG/1; MG/1; MG/1
TABLET ORAL
Qty: 90 TABLET | Refills: 0 | Status: SHIPPED | OUTPATIENT
Start: 2025-06-30

## 2025-06-30 NOTE — TELEPHONE ENCOUNTER
I verified via [facility] that last pickup was 5/28/2025, so I rewrote script to allow for pickup today.  Discussed with patient.

## 2025-07-02 ENCOUNTER — SPECIALTY PHARMACY (OUTPATIENT)
Dept: PHARMACY | Facility: CLINIC | Age: 45
End: 2025-07-02

## 2025-07-08 PROCEDURE — RXMED WILLOW AMBULATORY MEDICATION CHARGE

## 2025-07-15 ENCOUNTER — PHARMACY VISIT (OUTPATIENT)
Dept: PHARMACY | Facility: CLINIC | Age: 45
End: 2025-07-15
Payer: COMMERCIAL

## 2025-07-15 ENCOUNTER — SPECIALTY PHARMACY (OUTPATIENT)
Dept: PHARMACY | Facility: CLINIC | Age: 45
End: 2025-07-15

## 2025-07-23 DIAGNOSIS — F90.2 ATTENTION DEFICIT HYPERACTIVITY DISORDER (ADHD), COMBINED TYPE: ICD-10-CM

## 2025-07-23 RX ORDER — DEXTROAMPHETAMINE SACCHARATE, AMPHETAMINE ASPARTATE, DEXTROAMPHETAMINE SULFATE AND AMPHETAMINE SULFATE 5; 5; 5; 5 MG/1; MG/1; MG/1; MG/1
TABLET ORAL
Qty: 90 TABLET | Refills: 0 | Status: SHIPPED | OUTPATIENT
Start: 2025-07-23

## 2025-08-11 PROCEDURE — RXMED WILLOW AMBULATORY MEDICATION CHARGE

## 2025-08-15 ENCOUNTER — SPECIALTY PHARMACY (OUTPATIENT)
Dept: PHARMACY | Facility: CLINIC | Age: 45
End: 2025-08-15

## 2025-08-18 ENCOUNTER — PHARMACY VISIT (OUTPATIENT)
Dept: PHARMACY | Facility: CLINIC | Age: 45
End: 2025-08-18
Payer: COMMERCIAL

## 2025-08-19 ENCOUNTER — APPOINTMENT (OUTPATIENT)
Dept: NEUROLOGY | Facility: CLINIC | Age: 45
End: 2025-08-19
Payer: COMMERCIAL

## 2025-08-19 DIAGNOSIS — G35 MULTIPLE SCLEROSIS (MULTI): Primary | ICD-10-CM

## 2025-08-19 DIAGNOSIS — M54.16 LUMBAR RADICULOPATHY: ICD-10-CM

## 2025-08-19 DIAGNOSIS — F90.2 ATTENTION DEFICIT HYPERACTIVITY DISORDER (ADHD), COMBINED TYPE: ICD-10-CM

## 2025-08-19 PROCEDURE — 99213 OFFICE O/P EST LOW 20 MIN: CPT | Performed by: PSYCHIATRY & NEUROLOGY

## 2025-08-28 DIAGNOSIS — F90.2 ATTENTION DEFICIT HYPERACTIVITY DISORDER (ADHD), COMBINED TYPE: ICD-10-CM

## 2025-08-28 RX ORDER — DEXTROAMPHETAMINE SACCHARATE, AMPHETAMINE ASPARTATE, DEXTROAMPHETAMINE SULFATE AND AMPHETAMINE SULFATE 5; 5; 5; 5 MG/1; MG/1; MG/1; MG/1
TABLET ORAL
Qty: 90 TABLET | Refills: 0 | Status: SHIPPED | OUTPATIENT
Start: 2025-08-28